# Patient Record
Sex: MALE | Race: WHITE | NOT HISPANIC OR LATINO
[De-identification: names, ages, dates, MRNs, and addresses within clinical notes are randomized per-mention and may not be internally consistent; named-entity substitution may affect disease eponyms.]

---

## 2019-11-07 PROBLEM — Z00.00 ENCOUNTER FOR PREVENTIVE HEALTH EXAMINATION: Status: ACTIVE | Noted: 2019-11-07

## 2019-11-12 ENCOUNTER — APPOINTMENT (OUTPATIENT)
Dept: DERMATOLOGY | Facility: CLINIC | Age: 34
End: 2019-11-12
Payer: COMMERCIAL

## 2019-11-12 VITALS — HEIGHT: 72 IN | BODY MASS INDEX: 30.48 KG/M2 | WEIGHT: 225 LBS

## 2019-11-12 DIAGNOSIS — D22.9 MELANOCYTIC NEVI, UNSPECIFIED: ICD-10-CM

## 2019-11-12 PROCEDURE — 99203 OFFICE O/P NEW LOW 30 MIN: CPT | Mod: 25

## 2019-11-12 PROCEDURE — 54056 CRYOSURGERY PENIS LESION(S): CPT

## 2019-11-12 RX ORDER — DOXYCYCLINE HYCLATE 100 MG/1
100 TABLET ORAL
Qty: 14 | Refills: 0 | Status: ACTIVE | COMMUNITY
Start: 2019-11-07

## 2019-12-30 ENCOUNTER — APPOINTMENT (OUTPATIENT)
Dept: DERMATOLOGY | Facility: CLINIC | Age: 34
End: 2019-12-30
Payer: COMMERCIAL

## 2019-12-30 DIAGNOSIS — L73.9 FOLLICULAR DISORDER, UNSPECIFIED: ICD-10-CM

## 2019-12-30 DIAGNOSIS — B08.1 MOLLUSCUM CONTAGIOSUM: ICD-10-CM

## 2019-12-30 PROCEDURE — 54056 CRYOSURGERY PENIS LESION(S): CPT

## 2019-12-30 PROCEDURE — 99213 OFFICE O/P EST LOW 20 MIN: CPT | Mod: 25

## 2019-12-30 RX ORDER — CLINDAMYCIN PHOSPHATE 10 MG/ML
1 LOTION TOPICAL DAILY
Qty: 1 | Refills: 5 | Status: ACTIVE | COMMUNITY
Start: 2019-11-12 | End: 1900-01-01

## 2019-12-30 RX ORDER — IMIQUIMOD 50 MG/G
5 CREAM TOPICAL
Qty: 1 | Refills: 1 | Status: ACTIVE | COMMUNITY
Start: 2019-11-12 | End: 1900-01-01

## 2019-12-31 NOTE — HISTORY OF PRESENT ILLNESS
[FreeTextEntry1] : fuv; molluscum and folliculitis [de-identified] : Mr. RUBI MCKEE is a 34 year old M here for evaluation of below\par \par Problem: molluscum\par Location: penis, scrotum\par Duration: 6 weeks \par Associated symptoms/Aggravating Factors: spreading, new lesions today \par - sexually active, new encounter recently \par - has kids at home\par - Recently tested for HIV/syphilis/chlamydia, which was negative \par Modifying factors/Treatments: Went to [facility] urgent care in [place], and had 2 sessions of cryotherapy (most recent 1 week ago on genitalia). Also put on PO doxycycline 100mg BID x7 days to prevent infection \par - 11/19/19: 4 lesions on penis and scrotum tx with cryotherapy. Started Imiquimod 5% cream TIW \par - 12/30/19: Pt notes that the prior lesions have resolved, but still getting new spots in right groin and scrotum\par \par Problem: red spots \par Location: torso, arms\par Duration: weeks\par Associated symptoms/Aggravating Factors: asymptomatic\par Modifying factors/Treatments: Has not been treated. Pt unsure if these are molluscum too\par - 11/19/19: Start OTC BP wash, clindamycin lotion, and finish PO doxycycline course as above\par - 12/30/19: Significant improvement since LV, but still few red spots on arms. Not using anything now bc BP wash made his shoulders too dry\par \par No other changing or concerning lesions. \par No itchy, growing, bleeding, painful, or changing moles. \par \par Derm Hx: none \par Personal hx of skin cancer: no\par FHx of skin cancer: yes in father/GF, pt unsure what type\par Social Hx: works in business/tech\par \par

## 2019-12-31 NOTE — PHYSICAL EXAM
[Oriented x 3] : ~L oriented x 3 [Well Nourished] : well nourished [Alert] : alert [Conjunctiva Non-injected] : conjunctiva non-injected [No Visual Lymphadenopathy] : no visual  lymphadenopathy [No Clubbing] : no clubbing [No Edema] : no edema [No Chromhidrosis] : no chromhidrosis [No Bromhidrosis] : no bromhidrosis [Full Body Skin Exam Performed] : performed [FreeTextEntry3] : General: Alert and oriented, in NAD. \par All of the following were examined and were within normal limits, except as noted:  \par Scalp:\par Face:\par Neck:\par Chest/Back/Abdomen:\par Arms/Hands:\par Legs/Feet:\par Buttocks, Genitalia:  	\par Hair, Nails, Oral Mucosa, Eyes:  \par \par - numerous brown homogenous macules and papules on torso and extremities\par - few pink follicular papules on chest, upper arms, and back\par - white papules with central dell, some with erythematous base, on the scrotum x2 and right inguinal fold x8\par - erythematous patches on scrotum, right groin, right medial thigh at sites of recent cryotherapy

## 2019-12-31 NOTE — ASSESSMENT
[FreeTextEntry1] : Folliculitis, torso - mild \par - Diagnosis and treatment options discussed\par - Clipping hair instead of shaving hair with razor decreases the amount of this rash\par - Restart OTC 4-5% BP wash every other day to torso. SED, including dryness\par - Restart clindamycin lotion 1-2 times daily to the AA\par \par Molluscum contagiosum, multiple on genitalia - Improving with cryotherapy and Imiquimod 5% cream TIW, although still getting new lesions on scrotum/penis and right inguinal fold\par - Diagnosis and treatment options discussed\par We have discussed the nature and course of this condition.\par I have discussed the goals of therapy with the patient.\par We have discussed treatment options and expectations from treatment.\par - The patient was informed of the pathophysiology of their lesions #10 total and their treatment course with liquid nitrogen (cryosurgery). Side effects include blister formation, hypopigmentation, and scarring. Patient was verbally consented and the lesions identified above were treated with liquid nitrogen freeze, thaw, freeze x 10 seconds each cycle x 2. The patient tolerated the procedure well.  Wound care instructions, care of a blister with vaseline, signs and symptoms of infection were discussed in full.  The patient denies any questions at this time.\par - After 1 week, restart Imiquimod 5% cream three times weekly to affected areas for total 16 weeks. Side effects discussed including erythema, irritation/pain, and crusting\par \par RTC: 6 weeks

## 2023-01-04 ENCOUNTER — NON-APPOINTMENT (OUTPATIENT)
Age: 38
End: 2023-01-04

## 2023-07-11 ENCOUNTER — NON-APPOINTMENT (OUTPATIENT)
Age: 38
End: 2023-07-11

## 2024-01-22 ENCOUNTER — NON-APPOINTMENT (OUTPATIENT)
Age: 39
End: 2024-01-22